# Patient Record
Sex: FEMALE | Race: WHITE | Employment: FULL TIME | URBAN - NONMETROPOLITAN AREA
[De-identification: names, ages, dates, MRNs, and addresses within clinical notes are randomized per-mention and may not be internally consistent; named-entity substitution may affect disease eponyms.]

---

## 2017-06-05 ENCOUNTER — OFFICE VISIT (OUTPATIENT)
Dept: FAMILY MEDICINE CLINIC | Age: 22
End: 2017-06-05
Payer: MEDICAID

## 2017-06-05 VITALS
HEART RATE: 68 BPM | WEIGHT: 221 LBS | TEMPERATURE: 98.8 F | HEIGHT: 65 IN | OXYGEN SATURATION: 97 % | RESPIRATION RATE: 20 BRPM | SYSTOLIC BLOOD PRESSURE: 116 MMHG | DIASTOLIC BLOOD PRESSURE: 78 MMHG | BODY MASS INDEX: 36.82 KG/M2

## 2017-06-05 DIAGNOSIS — R79.89 ELEVATED FERRITIN: ICD-10-CM

## 2017-06-05 DIAGNOSIS — H04.332: Primary | ICD-10-CM

## 2017-06-05 LAB
BASOPHILS ABSOLUTE: 0.1 K/UL (ref 0–0.2)
BASOPHILS RELATIVE PERCENT: 0.5 % (ref 0–1)
EOSINOPHILS ABSOLUTE: 0.3 K/UL (ref 0–0.6)
EOSINOPHILS RELATIVE PERCENT: 3.2 % (ref 0–5)
FERRITIN: 42.3 NG/ML (ref 13–150)
FOLATE: 6.2 NG/ML (ref 4.8–37.3)
HCT VFR BLD CALC: 42.9 % (ref 37–47)
HEMOGLOBIN: 14.5 G/DL (ref 12–16)
IRON SATURATION: 18 % (ref 14–50)
IRON: 63 UG/DL (ref 37–145)
LYMPHOCYTES ABSOLUTE: 3.4 K/UL (ref 1.1–4.5)
LYMPHOCYTES RELATIVE PERCENT: 32.5 % (ref 20–40)
MCH RBC QN AUTO: 29.6 PG (ref 27–31)
MCHC RBC AUTO-ENTMCNC: 33.8 G/DL (ref 33–37)
MCV RBC AUTO: 87.6 FL (ref 81–99)
MONOCYTES ABSOLUTE: 1 K/UL (ref 0–0.9)
MONOCYTES RELATIVE PERCENT: 9.5 % (ref 0–10)
NEUTROPHILS ABSOLUTE: 5.6 K/UL (ref 1.5–7.5)
NEUTROPHILS RELATIVE PERCENT: 54.1 % (ref 50–65)
PDW BLD-RTO: 12.3 % (ref 11.5–14.5)
PLATELET # BLD: 427 K/UL (ref 130–400)
PMV BLD AUTO: 9.4 FL (ref 9.4–12.3)
RBC # BLD: 4.9 M/UL (ref 4.2–5.4)
RETICULOCYTE ABSOLUTE COUNT: 0.09 M/UL (ref 0.03–0.12)
RETICULOCYTE COUNT PCT: 1.88 % (ref 0.5–1.5)
TOTAL IRON BINDING CAPACITY: 341 UG/DL (ref 250–400)
WBC # BLD: 10.4 K/UL (ref 4.8–10.8)

## 2017-06-05 PROCEDURE — 99214 OFFICE O/P EST MOD 30 MIN: CPT | Performed by: NURSE PRACTITIONER

## 2017-06-05 RX ORDER — TOBRAMYCIN 3 MG/ML
1 SOLUTION/ DROPS OPHTHALMIC EVERY 4 HOURS
Qty: 1 BOTTLE | Refills: 0 | Status: SHIPPED | OUTPATIENT
Start: 2017-06-05 | End: 2017-06-15

## 2017-06-05 ASSESSMENT — ENCOUNTER SYMPTOMS
EYE REDNESS: 1
SHORTNESS OF BREATH: 0
EYE DISCHARGE: 1

## 2017-12-15 ENCOUNTER — OFFICE VISIT (OUTPATIENT)
Dept: FAMILY MEDICINE CLINIC | Age: 22
End: 2017-12-15
Payer: COMMERCIAL

## 2017-12-15 VITALS
DIASTOLIC BLOOD PRESSURE: 78 MMHG | RESPIRATION RATE: 20 BRPM | SYSTOLIC BLOOD PRESSURE: 126 MMHG | HEART RATE: 88 BPM | TEMPERATURE: 98.2 F | WEIGHT: 212 LBS | OXYGEN SATURATION: 98 % | BODY MASS INDEX: 35.28 KG/M2

## 2017-12-15 DIAGNOSIS — R11.15 NON-INTRACTABLE CYCLICAL VOMITING WITH NAUSEA: ICD-10-CM

## 2017-12-15 DIAGNOSIS — R10.13 DYSPEPSIA: ICD-10-CM

## 2017-12-15 DIAGNOSIS — R11.15 NON-INTRACTABLE CYCLICAL VOMITING WITH NAUSEA: Primary | ICD-10-CM

## 2017-12-15 LAB
ALBUMIN SERPL-MCNC: 4.8 G/DL (ref 3.5–5.2)
ALP BLD-CCNC: 69 U/L (ref 35–104)
ALT SERPL-CCNC: 25 U/L (ref 5–33)
AMYLASE: 47 U/L (ref 28–100)
ANION GAP SERPL CALCULATED.3IONS-SCNC: 14 MMOL/L (ref 7–19)
AST SERPL-CCNC: 20 U/L (ref 5–32)
BASOPHILS ABSOLUTE: 0 K/UL (ref 0–0.2)
BASOPHILS RELATIVE PERCENT: 0.3 % (ref 0–1)
BILIRUB SERPL-MCNC: 0.4 MG/DL (ref 0.2–1.2)
BILIRUBIN URINE: NEGATIVE
BLOOD, URINE: NEGATIVE
BUN BLDV-MCNC: 10 MG/DL (ref 6–20)
CALCIUM SERPL-MCNC: 9.5 MG/DL (ref 8.6–10)
CHLORIDE BLD-SCNC: 100 MMOL/L (ref 98–111)
CLARITY: ABNORMAL
CO2: 27 MMOL/L (ref 22–29)
COLOR: YELLOW
CREAT SERPL-MCNC: 0.7 MG/DL (ref 0.5–0.9)
EOSINOPHILS ABSOLUTE: 0.2 K/UL (ref 0–0.6)
EOSINOPHILS RELATIVE PERCENT: 1.8 % (ref 0–5)
GFR NON-AFRICAN AMERICAN: >60
GLUCOSE BLD-MCNC: 82 MG/DL (ref 74–109)
GLUCOSE URINE: NEGATIVE MG/DL
HCT VFR BLD CALC: 44.2 % (ref 37–47)
HEMOGLOBIN: 14.8 G/DL (ref 12–16)
KETONES, URINE: NEGATIVE MG/DL
LEUKOCYTE ESTERASE, URINE: NEGATIVE
LIPASE: 70 U/L (ref 13–60)
LYMPHOCYTES ABSOLUTE: 4.1 K/UL (ref 1.1–4.5)
LYMPHOCYTES RELATIVE PERCENT: 30.9 % (ref 20–40)
MCH RBC QN AUTO: 28.7 PG (ref 27–31)
MCHC RBC AUTO-ENTMCNC: 33.5 G/DL (ref 33–37)
MCV RBC AUTO: 85.7 FL (ref 81–99)
MONOCYTES ABSOLUTE: 0.8 K/UL (ref 0–0.9)
MONOCYTES RELATIVE PERCENT: 6.3 % (ref 0–10)
NEUTROPHILS ABSOLUTE: 8 K/UL (ref 1.5–7.5)
NEUTROPHILS RELATIVE PERCENT: 60.4 % (ref 50–65)
NITRITE, URINE: NEGATIVE
PDW BLD-RTO: 12.3 % (ref 11.5–14.5)
PH UA: 6
PLATELET # BLD: 571 K/UL (ref 130–400)
PMV BLD AUTO: 9.1 FL (ref 9.4–12.3)
POTASSIUM SERPL-SCNC: 4.2 MMOL/L (ref 3.5–5)
PROTEIN UA: NEGATIVE MG/DL
RBC # BLD: 5.16 M/UL (ref 4.2–5.4)
SODIUM BLD-SCNC: 141 MMOL/L (ref 136–145)
SPECIFIC GRAVITY UA: 1.03
T4 FREE: 1.3 NG/DL (ref 0.9–1.7)
TOTAL PROTEIN: 7.9 G/DL (ref 6.6–8.7)
TSH SERPL DL<=0.05 MIU/L-ACNC: 1.62 UIU/ML (ref 0.27–4.2)
UROBILINOGEN, URINE: 0.2 E.U./DL
WBC # BLD: 13.3 K/UL (ref 4.8–10.8)

## 2017-12-15 PROCEDURE — 99213 OFFICE O/P EST LOW 20 MIN: CPT | Performed by: NURSE PRACTITIONER

## 2017-12-15 RX ORDER — PANTOPRAZOLE SODIUM 40 MG/1
TABLET, DELAYED RELEASE ORAL
Qty: 45 TABLET | Refills: 2 | Status: SHIPPED | OUTPATIENT
Start: 2017-12-15 | End: 2018-01-03 | Stop reason: ALTCHOICE

## 2017-12-15 RX ORDER — PROMETHAZINE HYDROCHLORIDE 25 MG/1
25 TABLET ORAL EVERY 6 HOURS PRN
COMMUNITY
End: 2019-02-22 | Stop reason: SDUPTHER

## 2017-12-15 RX ORDER — ONDANSETRON HYDROCHLORIDE 8 MG/1
8 TABLET, FILM COATED ORAL EVERY 8 HOURS PRN
COMMUNITY
End: 2018-02-10 | Stop reason: SDUPTHER

## 2017-12-15 RX ORDER — DICYCLOMINE HCL 20 MG
20 TABLET ORAL 3 TIMES DAILY PRN
Qty: 60 TABLET | Refills: 2 | Status: SHIPPED | OUTPATIENT
Start: 2017-12-15

## 2017-12-15 ASSESSMENT — ENCOUNTER SYMPTOMS
NAUSEA: 1
ABDOMINAL PAIN: 1
VOMITING: 1
DIARRHEA: 1

## 2017-12-15 NOTE — PROGRESS NOTES
Subjective:      Patient ID: Sofy Guerrero is a 25 y.o. female. Chief Complaint   Patient presents with    Nausea & Vomiting     Patient reports nausea and vomiting on and off since February. Patient reports going to [facility] ER last Friday and was told she had an intestinal virus. HPI    Pt states at hospital, pt had ua and lab. She was told that she had stomach virus. Pt is with family reporting that female members of family have had choley. Over the course of last 8-9mo pt states that she has had intermittent abd pain to upper abd/to back \"burning pulling sensation\". She reports that usually this lasts 3-4hrs at a time. She reports this last weekend symptoms were more significant. Food trigger-dairy, pork, spicy     Pt has been started on Prilosec when away at college (in April). States taking it for 20days. Improved for that timeframe    Vitals 12/15/2017 1/3/4552   SYSTOLIC 386 082   DIASTOLIC 78 78   Site Right Arm Right Arm   Position Sitting Sitting   Cuff Size Small Adult Large Adult   Pulse 88 68   Temp 98.2 98.8   Resp 20 20   Weight 212 lb 221 lb   Height  5' 5\"   BMI (wt*703/ht~2)  36.77 kg/m2       Pt states wt loss has been intentional  Pt has been making dietary changes to attempt improving sx. Review of Systems   Constitutional: Negative for fever and unexpected weight change. Gastrointestinal: Positive for abdominal pain, diarrhea, nausea and vomiting. Psychiatric/Behavioral: The patient is nervous/anxious. Past Medical History:   Diagnosis Date    Broken arm     MRSA (methicillin resistant staph aureus) culture positive 08/2014    Pericarditis 2000    hx     No current outpatient prescriptions on file prior to visit. No current facility-administered medications on file prior to visit. No Known Allergies    Objective:   Physical Exam   Constitutional: She is oriented to person, place, and time.  She appears well-developed and well-nourished. No distress. overweight   HENT:   Head: Normocephalic and atraumatic. Right Ear: External ear normal.   Left Ear: External ear normal.   Nose: Nose normal.   Mouth/Throat: Oropharynx is clear and moist. No oropharyngeal exudate. Eyes: Conjunctivae and EOM are normal. Pupils are equal, round, and reactive to light. Neck: Normal range of motion. Neck supple. No tracheal deviation present. Cardiovascular: Normal rate, regular rhythm and normal heart sounds. Pulmonary/Chest: Effort normal and breath sounds normal. No respiratory distress. Abdominal: Soft. Bowel sounds are normal. She exhibits no distension and no mass. There is no tenderness (epigastric and RUQ). There is no rebound and no guarding. Lymphadenopathy:     She has no cervical adenopathy. Neurological: She is alert and oriented to person, place, and time. Skin: Skin is warm and dry. She is not diaphoretic. Psychiatric: She has a normal mood and affect. Her behavior is normal.   Nursing note and vitals reviewed. /78 (Site: Right Arm, Position: Sitting, Cuff Size: Small Adult)   Pulse 88   Temp 98.2 °F (36.8 °C) (Temporal)   Resp 20   Wt 212 lb (96.2 kg)   SpO2 98%   BMI 35.28 kg/m²     Assessment:      1. Non-intractable cyclical vomiting with nausea  US GALLBLADDER RUQ    dicyclomine (BENTYL) 20 MG tablet    Urinalysis    CBC Auto Differential    TSH without Reflex    T4, Free    Comprehensive Metabolic Panel    AMYLASE    Lipase    H. Pylori Antigen, Stool    Clostridium Difficile Toxin/Antigen    Fecal leukocytes    Ova and parasite screen    Culture Stool   2.  Dyspepsia  US GALLBLADDER RUQ    pantoprazole (PROTONIX) 40 MG tablet    dicyclomine (BENTYL) 20 MG tablet    Urinalysis    CBC Auto Differential    TSH without Reflex    T4, Free    Comprehensive Metabolic Panel    AMYLASE    Lipase    H. Pylori Antigen, Stool    Clostridium Difficile Toxin/Antigen    Fecal leukocytes    Ova and parasite screen Culture Stool           Plan:    Plan as above  Lab today and schedule u/s  Start Protonix as ordered  F/u in office in 6wks and will be in touch with lab/tests before that time over the phone  PRN [facility] HIDA  PRN GI  F/u asap if worse

## 2017-12-20 DIAGNOSIS — R10.13 DYSPEPSIA: ICD-10-CM

## 2017-12-20 DIAGNOSIS — R11.15 NON-INTRACTABLE CYCLICAL VOMITING WITH NAUSEA: ICD-10-CM

## 2017-12-20 LAB
C DIFFICILE TOXIN, EIA: NORMAL
LACTOFERRIN, FECAL: NEGATIVE

## 2017-12-22 ENCOUNTER — HOSPITAL ENCOUNTER (OUTPATIENT)
Dept: GENERAL RADIOLOGY | Age: 22
Discharge: HOME | End: 2017-12-22
Payer: COMMERCIAL

## 2017-12-22 DIAGNOSIS — R11.15 NON-INTRACTABLE CYCLICAL VOMITING WITH NAUSEA: ICD-10-CM

## 2017-12-22 DIAGNOSIS — R10.13 DYSPEPSIA: ICD-10-CM

## 2017-12-22 LAB — H PYLORI ANTIGEN STOOL: NEGATIVE

## 2017-12-22 PROCEDURE — 76705 ECHO EXAM OF ABDOMEN: CPT

## 2017-12-23 LAB
OVA AND PARASITE TRICHROME: NEGATIVE
OVA AND PARASITE WET MOUNT: NEGATIVE

## 2017-12-24 LAB
CULTURE, STOOL: NORMAL
E COLI SHIGA TOXIN ASSAY: NORMAL

## 2017-12-27 DIAGNOSIS — R10.11 RUQ PAIN: ICD-10-CM

## 2017-12-27 DIAGNOSIS — R11.0 NAUSEA: Primary | ICD-10-CM

## 2018-01-03 ENCOUNTER — OFFICE VISIT (OUTPATIENT)
Dept: FAMILY MEDICINE CLINIC | Age: 23
End: 2018-01-03
Payer: COMMERCIAL

## 2018-01-03 VITALS
BODY MASS INDEX: 36.28 KG/M2 | DIASTOLIC BLOOD PRESSURE: 62 MMHG | TEMPERATURE: 97.9 F | OXYGEN SATURATION: 98 % | HEART RATE: 80 BPM | RESPIRATION RATE: 18 BRPM | SYSTOLIC BLOOD PRESSURE: 104 MMHG | WEIGHT: 218 LBS

## 2018-01-03 DIAGNOSIS — F32.A ANXIETY AND DEPRESSION: ICD-10-CM

## 2018-01-03 DIAGNOSIS — D72.829 LEUKOCYTOSIS, UNSPECIFIED TYPE: ICD-10-CM

## 2018-01-03 DIAGNOSIS — F41.9 ANXIETY AND DEPRESSION: ICD-10-CM

## 2018-01-03 DIAGNOSIS — K21.9 GASTROESOPHAGEAL REFLUX DISEASE, ESOPHAGITIS PRESENCE NOT SPECIFIED: Primary | ICD-10-CM

## 2018-01-03 DIAGNOSIS — R74.8 ELEVATED LIPASE: ICD-10-CM

## 2018-01-03 DIAGNOSIS — E78.2 MIXED HYPERLIPIDEMIA: ICD-10-CM

## 2018-01-03 PROCEDURE — 99214 OFFICE O/P EST MOD 30 MIN: CPT | Performed by: NURSE PRACTITIONER

## 2018-01-03 RX ORDER — DESVENLAFAXINE 50 MG/1
50 TABLET, EXTENDED RELEASE ORAL DAILY
Qty: 30 TABLET | Refills: 2 | Status: SHIPPED | OUTPATIENT
Start: 2018-01-03 | End: 2018-04-04 | Stop reason: SDUPTHER

## 2018-01-03 RX ORDER — ESOMEPRAZOLE MAGNESIUM 40 MG/1
40 CAPSULE, DELAYED RELEASE ORAL
Qty: 30 CAPSULE | Refills: 2 | Status: SHIPPED | OUTPATIENT
Start: 2018-01-03 | End: 2018-09-07 | Stop reason: SDUPTHER

## 2018-01-03 ASSESSMENT — ENCOUNTER SYMPTOMS
NAUSEA: 1
ABDOMINAL PAIN: 1
DIARRHEA: 1
VOMITING: 1

## 2018-01-03 NOTE — PROGRESS NOTES
Subjective:      Patient ID: Urbano Verdugo is a 25 y.o. female. Chief Complaint   Patient presents with    Follow-up     Patient is here today for a follow up r/t nausea and abdominal pain. Patient is scheduled for hida tomorrow. Patient states that she is not having as many \"attacks\" and is not vomiting as much.  Cyst     Patient has a knot in front of her right ear since yesterday. HPI        Knot   In front of right ear  Onset was yesterday  Pt reports having \"cold\" over the last few days          Anxiety  Onset years even as 8-9yr old  celexa x7mo and lexapro x 5mo and pt did not have any improvement but deal with nausea  Pt does have h/o panic attacks  Pt is dreadful of going back to work   She states that she has less anxiety and less abdominal symptoms       Nausea/vomiting  Last vomiting episode was Dec 27th. This is gradually improving. tx-Protonix. Pt states she paid out of pocket for Protonix. Pt had negative U/s gallbladder and is scheduled for HIDA tomorrow. Abdominal cramps have also improved        Review of Systems   Constitutional: Negative for fever. HENT: Positive for congestion. Gastrointestinal: Positive for abdominal pain, diarrhea, nausea and vomiting. Neurological: Negative for headaches. Psychiatric/Behavioral: The patient is nervous/anxious.       Past Medical History:   Diagnosis Date    Broken arm     MRSA (methicillin resistant staph aureus) culture positive 08/2014    Pericarditis 2000    hx     Current Outpatient Prescriptions on File Prior to Visit   Medication Sig Dispense Refill    ondansetron (ZOFRAN) 8 MG tablet Take 8 mg by mouth every 8 hours as needed for Nausea or Vomiting      dicyclomine (BENTYL) 20 MG tablet Take 1 tablet by mouth 3 times daily as needed (abdominal spasms) 60 tablet 2    promethazine (PHENERGAN) 25 MG tablet Take 25 mg by mouth every 6 hours as needed for Nausea       No current facility-administered medications on file prior to visit. No Known Allergies    Objective:   Physical Exam   Constitutional: She is oriented to person, place, and time. She appears well-developed and well-nourished. No distress. HENT:   Head: Normocephalic and atraumatic. Right Ear: External ear normal.   Left Ear: External ear normal.   Nose: Nose normal.   Mouth/Throat: Oropharynx is clear and moist. No oropharyngeal exudate. Eyes: Conjunctivae and EOM are normal. Pupils are equal, round, and reactive to light. Neck: Normal range of motion. Neck supple. No tracheal deviation present. Cardiovascular: Normal rate, regular rhythm and normal heart sounds. Pulmonary/Chest: Effort normal and breath sounds normal. No respiratory distress. Abdominal: Soft. Bowel sounds are normal. There is no tenderness. Lymphadenopathy:        Head (right side): Preauricular adenopathy present. She has no cervical adenopathy. Neurological: She is alert and oriented to person, place, and time. Skin: Skin is warm and dry. She is not diaphoretic. Psychiatric: She has a normal mood and affect. Her behavior is normal.   Nursing note and vitals reviewed. /62 (Site: Left Arm, Position: Sitting, Cuff Size: Small Adult)   Pulse 80   Temp 97.9 °F (36.6 °C) (Temporal)   Resp 18   Wt 218 lb (98.9 kg)   SpO2 98%   BMI 36.28 kg/m²     Assessment:      1. Gastroesophageal reflux disease, esophagitis presence not specified  esomeprazole (NEXIUM) 40 MG delayed release capsule   2. Mixed hyperlipidemia  Lipid Panel   3. Elevated lipase  Lipase   4. Leukocytosis, unspecified type  CBC Auto Differential   5. Anxiety and depression  desvenlafaxine succinate (PRISTIQ) 50 MG TB24 extended release tablet           Plan:    Plan as above  Lab due in 2wks  Side effects of new treatment discussed. Pt will f/u asap if any increase in negativity. Never stop medication without consulting healthcare provider.   F/u ov approx 2-3wks

## 2018-01-04 ENCOUNTER — HOSPITAL ENCOUNTER (OUTPATIENT)
Dept: NUCLEAR MEDICINE | Age: 23
Discharge: HOME | End: 2018-01-04
Payer: COMMERCIAL

## 2018-01-04 DIAGNOSIS — R11.0 NAUSEA: ICD-10-CM

## 2018-01-04 DIAGNOSIS — R10.11 RUQ PAIN: ICD-10-CM

## 2018-01-04 PROCEDURE — 78227 HEPATOBIL SYST IMAGE W/DRUG: CPT

## 2018-01-04 PROCEDURE — 6360000004 HC RX CONTRAST MEDICATION: Performed by: NURSE PRACTITIONER

## 2018-01-04 PROCEDURE — 3430000000 HC RX DIAGNOSTIC RADIOPHARMACEUTICAL: Performed by: NURSE PRACTITIONER

## 2018-01-04 PROCEDURE — A9537 TC99M MEBROFENIN: HCPCS | Performed by: NURSE PRACTITIONER

## 2018-01-04 RX ADMIN — Medication 5 MILLICURIE: at 14:09

## 2018-01-04 RX ADMIN — SINCALIDE 2 MCG: 5 INJECTION, POWDER, LYOPHILIZED, FOR SOLUTION INTRAVENOUS at 14:09

## 2018-01-05 ENCOUNTER — TELEPHONE (OUTPATIENT)
Dept: FAMILY MEDICINE CLINIC | Age: 23
End: 2018-01-05

## 2018-01-05 NOTE — TELEPHONE ENCOUNTER
PA request for generic Pristiq and generic Nexium have been approved. Patient and [address] [facility].

## 2018-01-09 NOTE — PROGRESS NOTES
EF of 38.4% was noted on HIDA. Normal is 35% or more. Pt has reported some improvement with PPI and needs to continue tx and keep f/u as we discussed.

## 2018-01-26 ENCOUNTER — OFFICE VISIT (OUTPATIENT)
Dept: FAMILY MEDICINE CLINIC | Age: 23
End: 2018-01-26
Payer: COMMERCIAL

## 2018-01-26 VITALS
HEART RATE: 77 BPM | OXYGEN SATURATION: 98 % | BODY MASS INDEX: 35.16 KG/M2 | RESPIRATION RATE: 16 BRPM | SYSTOLIC BLOOD PRESSURE: 120 MMHG | WEIGHT: 211 LBS | HEIGHT: 65 IN | DIASTOLIC BLOOD PRESSURE: 72 MMHG | TEMPERATURE: 98.3 F

## 2018-01-26 DIAGNOSIS — E78.2 MIXED HYPERLIPIDEMIA: ICD-10-CM

## 2018-01-26 DIAGNOSIS — D72.829 LEUKOCYTOSIS, UNSPECIFIED TYPE: ICD-10-CM

## 2018-01-26 DIAGNOSIS — F41.9 ANXIETY: ICD-10-CM

## 2018-01-26 DIAGNOSIS — R74.8 ELEVATED LIPASE: ICD-10-CM

## 2018-01-26 DIAGNOSIS — R11.0 NAUSEA: ICD-10-CM

## 2018-01-26 DIAGNOSIS — K21.9 GASTROESOPHAGEAL REFLUX DISEASE, ESOPHAGITIS PRESENCE NOT SPECIFIED: Primary | ICD-10-CM

## 2018-01-26 LAB
BASOPHILS ABSOLUTE: 0.1 K/UL (ref 0–0.2)
BASOPHILS RELATIVE PERCENT: 0.5 % (ref 0–1)
CHOLESTEROL, TOTAL: 214 MG/DL (ref 160–199)
EOSINOPHILS ABSOLUTE: 0.2 K/UL (ref 0–0.6)
EOSINOPHILS RELATIVE PERCENT: 1.8 % (ref 0–5)
HCT VFR BLD CALC: 44.9 % (ref 37–47)
HDLC SERPL-MCNC: 41 MG/DL (ref 65–121)
HEMOGLOBIN: 14.8 G/DL (ref 12–16)
LDL CHOLESTEROL CALCULATED: 148 MG/DL
LIPASE: 42 U/L (ref 13–60)
LYMPHOCYTES ABSOLUTE: 3.2 K/UL (ref 1.1–4.5)
LYMPHOCYTES RELATIVE PERCENT: 30 % (ref 20–40)
MCH RBC QN AUTO: 28.7 PG (ref 27–31)
MCHC RBC AUTO-ENTMCNC: 33 G/DL (ref 33–37)
MCV RBC AUTO: 87 FL (ref 81–99)
MONOCYTES ABSOLUTE: 0.9 K/UL (ref 0–0.9)
MONOCYTES RELATIVE PERCENT: 8.1 % (ref 0–10)
NEUTROPHILS ABSOLUTE: 6.3 K/UL (ref 1.5–7.5)
NEUTROPHILS RELATIVE PERCENT: 59.3 % (ref 50–65)
PDW BLD-RTO: 12.8 % (ref 11.5–14.5)
PLATELET # BLD: 445 K/UL (ref 130–400)
PMV BLD AUTO: 9.4 FL (ref 9.4–12.3)
RBC # BLD: 5.16 M/UL (ref 4.2–5.4)
TRIGL SERPL-MCNC: 124 MG/DL (ref 0–149)
WBC # BLD: 10.7 K/UL (ref 4.8–10.8)

## 2018-01-26 PROCEDURE — 99213 OFFICE O/P EST LOW 20 MIN: CPT | Performed by: NURSE PRACTITIONER

## 2018-01-26 RX ORDER — ONDANSETRON 8 MG/1
TABLET, ORALLY DISINTEGRATING ORAL
Qty: 20 TABLET | Refills: 0 | Status: SHIPPED | OUTPATIENT
Start: 2018-01-26 | End: 2018-09-07 | Stop reason: SDUPTHER

## 2018-01-26 ASSESSMENT — ENCOUNTER SYMPTOMS
ABDOMINAL PAIN: 1
NAUSEA: 1
VOMITING: 1
CONSTIPATION: 1
DIARRHEA: 0

## 2018-01-26 NOTE — PROGRESS NOTES
(NEXIUM) 40 MG delayed release capsule Take 1 capsule by mouth every morning (before breakfast) 30 capsule 2    promethazine (PHENERGAN) 25 MG tablet Take 25 mg by mouth every 6 hours as needed for Nausea      ondansetron (ZOFRAN) 8 MG tablet Take 8 mg by mouth every 8 hours as needed for Nausea or Vomiting      dicyclomine (BENTYL) 20 MG tablet Take 1 tablet by mouth 3 times daily as needed (abdominal spasms) 60 tablet 2     No current facility-administered medications on file prior to visit. No Known Allergies    Objective:   Physical Exam   Constitutional: She is oriented to person, place, and time. She appears well-developed and well-nourished. overwt. HENT:   Head: Normocephalic and atraumatic. Eyes: Conjunctivae and EOM are normal. Pupils are equal, round, and reactive to light. Neck: Normal range of motion. Neck supple. No tracheal deviation present. Cardiovascular: Normal rate, regular rhythm and normal heart sounds. Pulmonary/Chest: Effort normal and breath sounds normal.   Abdominal: Soft. Bowel sounds are normal. She exhibits no distension. There is tenderness (mild lower abd pain (pt reports menses)). There is no rebound. Neurological: She is alert and oriented to person, place, and time. Skin: Skin is warm and dry. Psychiatric: She has a normal mood and affect. Her speech is normal and behavior is normal. Judgment and thought content normal. Her mood appears not anxious. Her affect is not angry. Cognition and memory are normal. She does not exhibit a depressed mood. Nursing note and vitals reviewed. /72 (Site: Left Arm, Position: Sitting, Cuff Size: Medium Adult)   Pulse 77   Temp 98.3 °F (36.8 °C) (Temporal)   Resp 16   Ht 5' 5\" (1.651 m)   Wt 211 lb (95.7 kg)   SpO2 98%   BMI 35.11 kg/m²     Assessment:      1. Gastroesophageal reflux disease, esophagitis presence not specified  [facility]- Fiorella Guaman MD   2.  Nausea  [facility]-

## 2018-02-10 ENCOUNTER — OFFICE VISIT (OUTPATIENT)
Dept: FAMILY MEDICINE CLINIC | Age: 23
End: 2018-02-10
Payer: COMMERCIAL

## 2018-02-10 VITALS
SYSTOLIC BLOOD PRESSURE: 118 MMHG | OXYGEN SATURATION: 97 % | HEART RATE: 90 BPM | WEIGHT: 208.13 LBS | DIASTOLIC BLOOD PRESSURE: 70 MMHG | TEMPERATURE: 98.2 F | BODY MASS INDEX: 34.63 KG/M2

## 2018-02-10 DIAGNOSIS — J01.90 ACUTE SINUSITIS, RECURRENCE NOT SPECIFIED, UNSPECIFIED LOCATION: Primary | ICD-10-CM

## 2018-02-10 DIAGNOSIS — B00.1 COLD SORE: ICD-10-CM

## 2018-02-10 PROCEDURE — 99213 OFFICE O/P EST LOW 20 MIN: CPT | Performed by: NURSE PRACTITIONER

## 2018-02-10 RX ORDER — VALACYCLOVIR HYDROCHLORIDE 1 G/1
2000 TABLET, FILM COATED ORAL 2 TIMES DAILY
Qty: 4 TABLET | Refills: 2 | Status: SHIPPED | OUTPATIENT
Start: 2018-02-10 | End: 2018-09-07 | Stop reason: SDUPTHER

## 2018-02-10 RX ORDER — BROMPHENIRAMINE MALEATE, PSEUDOEPHEDRINE HYDROCHLORIDE, AND DEXTROMETHORPHAN HYDROBROMIDE 2; 30; 10 MG/5ML; MG/5ML; MG/5ML
5 SYRUP ORAL 4 TIMES DAILY PRN
Qty: 150 ML | Refills: 0 | COMMUNITY
Start: 2018-02-10 | End: 2018-09-07

## 2018-02-10 RX ORDER — METHYLPREDNISOLONE 4 MG/1
TABLET ORAL
Qty: 1 KIT | Refills: 0 | Status: SHIPPED | OUTPATIENT
Start: 2018-02-10 | End: 2018-02-16

## 2018-02-10 RX ORDER — AMOXICILLIN AND CLAVULANATE POTASSIUM 875; 125 MG/1; MG/1
1 TABLET, FILM COATED ORAL 2 TIMES DAILY
Qty: 20 TABLET | Refills: 0 | Status: SHIPPED | OUTPATIENT
Start: 2018-02-10 | End: 2018-02-20

## 2018-02-10 ASSESSMENT — ENCOUNTER SYMPTOMS
GASTROINTESTINAL NEGATIVE: 1
COUGH: 1
EYES NEGATIVE: 1
ALLERGIC/IMMUNOLOGIC NEGATIVE: 1

## 2018-04-04 DIAGNOSIS — F32.A ANXIETY AND DEPRESSION: ICD-10-CM

## 2018-04-04 DIAGNOSIS — F41.9 ANXIETY AND DEPRESSION: ICD-10-CM

## 2018-04-04 RX ORDER — DESVENLAFAXINE 50 MG/1
TABLET, EXTENDED RELEASE ORAL
Qty: 30 TABLET | Refills: 1 | Status: SHIPPED | OUTPATIENT
Start: 2018-04-04 | End: 2019-01-30 | Stop reason: ALTCHOICE

## 2018-09-07 ENCOUNTER — OFFICE VISIT (OUTPATIENT)
Dept: FAMILY MEDICINE CLINIC | Age: 23
End: 2018-09-07
Payer: COMMERCIAL

## 2018-09-07 VITALS
OXYGEN SATURATION: 98 % | SYSTOLIC BLOOD PRESSURE: 126 MMHG | WEIGHT: 184 LBS | RESPIRATION RATE: 20 BRPM | HEART RATE: 85 BPM | TEMPERATURE: 98.5 F | BODY MASS INDEX: 30.66 KG/M2 | HEIGHT: 65 IN | DIASTOLIC BLOOD PRESSURE: 76 MMHG

## 2018-09-07 DIAGNOSIS — B00.1 COLD SORE: ICD-10-CM

## 2018-09-07 DIAGNOSIS — Z30.011 ENCOUNTER FOR INITIAL PRESCRIPTION OF CONTRACEPTIVE PILLS: Primary | ICD-10-CM

## 2018-09-07 DIAGNOSIS — F41.9 ANXIETY AND DEPRESSION: ICD-10-CM

## 2018-09-07 DIAGNOSIS — Z30.011 ENCOUNTER FOR INITIAL PRESCRIPTION OF CONTRACEPTIVE PILLS: ICD-10-CM

## 2018-09-07 DIAGNOSIS — R11.0 NAUSEA: ICD-10-CM

## 2018-09-07 DIAGNOSIS — F32.A ANXIETY AND DEPRESSION: ICD-10-CM

## 2018-09-07 DIAGNOSIS — K21.9 GASTROESOPHAGEAL REFLUX DISEASE, ESOPHAGITIS PRESENCE NOT SPECIFIED: ICD-10-CM

## 2018-09-07 LAB — HCG(URINE) PREGNANCY TEST: NEGATIVE

## 2018-09-07 PROCEDURE — 99214 OFFICE O/P EST MOD 30 MIN: CPT | Performed by: NURSE PRACTITIONER

## 2018-09-07 PROCEDURE — G0444 DEPRESSION SCREEN ANNUAL: HCPCS | Performed by: NURSE PRACTITIONER

## 2018-09-07 RX ORDER — ESOMEPRAZOLE MAGNESIUM 40 MG/1
40 CAPSULE, DELAYED RELEASE ORAL
Qty: 30 CAPSULE | Refills: 3 | Status: SHIPPED | OUTPATIENT
Start: 2018-09-07

## 2018-09-07 RX ORDER — NORGESTIMATE AND ETHINYL ESTRADIOL 7DAYSX3 LO
KIT ORAL
Qty: 28 TABLET | Refills: 5 | Status: SHIPPED | OUTPATIENT
Start: 2018-09-07 | End: 2019-01-04 | Stop reason: SDUPTHER

## 2018-09-07 RX ORDER — ONDANSETRON 8 MG/1
TABLET, ORALLY DISINTEGRATING ORAL
Qty: 20 TABLET | Refills: 0 | Status: SHIPPED | OUTPATIENT
Start: 2018-09-07 | End: 2019-02-22 | Stop reason: SDUPTHER

## 2018-09-07 RX ORDER — PAROXETINE HYDROCHLORIDE 12.5 MG/1
12.5 TABLET, FILM COATED, EXTENDED RELEASE ORAL DAILY
Qty: 30 TABLET | Refills: 3 | Status: SHIPPED | OUTPATIENT
Start: 2018-09-07 | End: 2019-01-03 | Stop reason: SDUPTHER

## 2018-09-07 RX ORDER — VALACYCLOVIR HYDROCHLORIDE 1 G/1
2000 TABLET, FILM COATED ORAL 2 TIMES DAILY
Qty: 4 TABLET | Refills: 11 | Status: SHIPPED | OUTPATIENT
Start: 2018-09-07 | End: 2018-09-08

## 2018-09-07 ASSESSMENT — PATIENT HEALTH QUESTIONNAIRE - PHQ9
9. THOUGHTS THAT YOU WOULD BE BETTER OFF DEAD, OR OF HURTING YOURSELF: 2
8. MOVING OR SPEAKING SO SLOWLY THAT OTHER PEOPLE COULD HAVE NOTICED. OR THE OPPOSITE, BEING SO FIGETY OR RESTLESS THAT YOU HAVE BEEN MOVING AROUND A LOT MORE THAN USUAL: 3
7. TROUBLE CONCENTRATING ON THINGS, SUCH AS READING THE NEWSPAPER OR WATCHING TELEVISION: 2
1. LITTLE INTEREST OR PLEASURE IN DOING THINGS: 3
SUM OF ALL RESPONSES TO PHQ QUESTIONS 1-9: 24
SUM OF ALL RESPONSES TO PHQ QUESTIONS 1-9: 24
6. FEELING BAD ABOUT YOURSELF - OR THAT YOU ARE A FAILURE OR HAVE LET YOURSELF OR YOUR FAMILY DOWN: 2
10. IF YOU CHECKED OFF ANY PROBLEMS, HOW DIFFICULT HAVE THESE PROBLEMS MADE IT FOR YOU TO DO YOUR WORK, TAKE CARE OF THINGS AT HOME, OR GET ALONG WITH OTHER PEOPLE: 3
2. FEELING DOWN, DEPRESSED OR HOPELESS: 3
5. POOR APPETITE OR OVEREATING: 3
4. FEELING TIRED OR HAVING LITTLE ENERGY: 3
SUM OF ALL RESPONSES TO PHQ9 QUESTIONS 1 & 2: 6
3. TROUBLE FALLING OR STAYING ASLEEP: 3

## 2018-09-07 ASSESSMENT — ENCOUNTER SYMPTOMS: NAUSEA: 1

## 2018-09-07 NOTE — PROGRESS NOTES
[facility]   Norgestim-Eth Estrad Triphasic (ORTHO TRI-CYCLEN LO) 0.18/0.215/0.25 MG-25 MCG TABS 1 po daily Yes [facility]   PARoxetine (PAXIL CR) 12.5 MG extended release tablet Take 1 tablet by mouth daily Yes [facility]   esomeprazole (NEXIUM) 40 MG delayed release capsule Take 1 capsule by mouth every morning (before breakfast) Yes [facility]   ondansetron (ZOFRAN-ODT) 8 MG TBDP disintegrating tablet 1/2-1 meltaway orally every 8hrs prn nausea and vomiting Yes [facility]   promethazine (PHENERGAN) 25 MG tablet Take 25 mg by mouth every 6 hours as needed for Nausea Yes Historical Provider, MD   dicyclomine (BENTYL) 20 MG tablet Take 1 tablet by mouth 3 times daily as needed (abdominal spasms) Yes [facility]   desvenlafaxine succinate (PRISTIQ) 50 MG TB24 extended release tablet TAKE ONE TABLET BY MOUTH DAILY  [facility]     No Known Allergies  Past Surgical History:   Procedure Laterality Date    HERNIA REPAIR  07/2007    TONSILLECTOMY  2010     Family History   Problem Relation Age of Onset    Heart Failure Mother     Other Father         alcoholic    Diabetes Maternal Grandfather     Heart Failure Maternal Grandfather     Other Other         dementia great grandmother     Social History     Social History    Marital status: Single     Spouse name: N/A    Number of children: N/A    Years of education: N/A     Occupational History    Not on file. Social History Main Topics    Smoking status: Never Smoker    Smokeless tobacco: Never Used    Alcohol use 0.0 oz/week      Comment: rarely    Drug use: No    Sexual activity: Not on file     Other Topics Concern    Not on file     Social History Narrative    No narrative on file       Review of Systems   Constitutional: Positive for fatigue. Negative for unexpected weight change. HENT:        Cold sores     Gastrointestinal: Positive for nausea (intermittent).    Genitourinary: Negative for menstrual

## 2018-09-28 ENCOUNTER — HOSPITAL ENCOUNTER (OUTPATIENT)
Age: 23
Setting detail: SPECIMEN
Discharge: HOME | End: 2018-09-28
Payer: COMMERCIAL

## 2018-09-28 ENCOUNTER — OFFICE VISIT (OUTPATIENT)
Dept: FAMILY MEDICINE CLINIC | Age: 23
End: 2018-09-28
Payer: COMMERCIAL

## 2018-09-28 VITALS
OXYGEN SATURATION: 98 % | DIASTOLIC BLOOD PRESSURE: 60 MMHG | SYSTOLIC BLOOD PRESSURE: 122 MMHG | BODY MASS INDEX: 31.16 KG/M2 | WEIGHT: 187 LBS | HEART RATE: 94 BPM | HEIGHT: 65 IN | TEMPERATURE: 98.4 F

## 2018-09-28 DIAGNOSIS — Z01.419 PAP SMEAR, AS PART OF ROUTINE GYNECOLOGICAL EXAMINATION: ICD-10-CM

## 2018-09-28 DIAGNOSIS — Z00.00 PREVENTATIVE HEALTH CARE: Primary | ICD-10-CM

## 2018-09-28 DIAGNOSIS — F41.9 ANXIETY: ICD-10-CM

## 2018-09-28 PROCEDURE — 88142 CYTOPATH C/V THIN LAYER: CPT

## 2018-09-28 PROCEDURE — 99395 PREV VISIT EST AGE 18-39: CPT | Performed by: NURSE PRACTITIONER

## 2018-09-28 ASSESSMENT — PATIENT HEALTH QUESTIONNAIRE - PHQ9
SUM OF ALL RESPONSES TO PHQ QUESTIONS 1-9: 0
SUM OF ALL RESPONSES TO PHQ9 QUESTIONS 1 & 2: 0
2. FEELING DOWN, DEPRESSED OR HOPELESS: 0
SUM OF ALL RESPONSES TO PHQ QUESTIONS 1-9: 0
1. LITTLE INTEREST OR PLEASURE IN DOING THINGS: 0

## 2018-09-28 NOTE — PROGRESS NOTES
[facility]     No Known Allergies  Past Surgical History:   Procedure Laterality Date    HERNIA REPAIR  07/2007    TONSILLECTOMY  2010     Family History   Problem Relation Age of Onset    Heart Failure Mother     Other Father         alcoholic    Diabetes Maternal Grandfather     Heart Failure Maternal Grandfather     Other Other         dementia great grandmother     Social History     Social History    Marital status: Single     Spouse name: N/A    Number of children: N/A    Years of education: N/A     Occupational History    Not on file. Social History Main Topics    Smoking status: Never Smoker    Smokeless tobacco: Never Used    Alcohol use 0.0 oz/week      Comment: rarely    Drug use: No    Sexual activity: Not on file     Other Topics Concern    Not on file     Social History Narrative    No narrative on file       Review of Systems   Constitutional: Negative for unexpected weight change. Genitourinary: Negative for menstrual problem. Psychiatric/Behavioral: The patient is nervous/anxious (improving). OBJECTIVE:    Physical Exam   Constitutional: She is oriented to person, place, and time. She appears well-developed and well-nourished. No distress. HENT:   Head: Normocephalic and atraumatic. Right Ear: External ear normal.   Left Ear: External ear normal.   Nose: Nose normal.   Mouth/Throat: Oropharynx is clear and moist. No oropharyngeal exudate. Eyes: Pupils are equal, round, and reactive to light. Conjunctivae and EOM are normal.   Neck: Trachea normal and normal range of motion. Neck supple. No JVD present. Carotid bruit is not present. No tracheal deviation present. No thyromegaly present. Cardiovascular: Normal rate, regular rhythm, normal heart sounds and intact distal pulses. Pulmonary/Chest: Effort normal and breath sounds normal. No respiratory distress.  Right breast exhibits no inverted nipple, no mass, no nipple discharge, no skin change and no
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
## 2019-01-04 ENCOUNTER — OFFICE VISIT (OUTPATIENT)
Dept: FAMILY MEDICINE CLINIC | Age: 24
End: 2019-01-04
Payer: COMMERCIAL

## 2019-01-04 VITALS
HEIGHT: 65 IN | OXYGEN SATURATION: 98 % | RESPIRATION RATE: 20 BRPM | HEART RATE: 83 BPM | WEIGHT: 201 LBS | TEMPERATURE: 99.2 F | DIASTOLIC BLOOD PRESSURE: 86 MMHG | BODY MASS INDEX: 33.49 KG/M2 | SYSTOLIC BLOOD PRESSURE: 126 MMHG

## 2019-01-04 DIAGNOSIS — F32.A ANXIETY AND DEPRESSION: ICD-10-CM

## 2019-01-04 DIAGNOSIS — F41.9 ANXIETY AND DEPRESSION: ICD-10-CM

## 2019-01-04 DIAGNOSIS — Z30.011 ENCOUNTER FOR INITIAL PRESCRIPTION OF CONTRACEPTIVE PILLS: ICD-10-CM

## 2019-01-04 DIAGNOSIS — F41.0 PANIC ATTACK: Primary | ICD-10-CM

## 2019-01-04 PROCEDURE — 99213 OFFICE O/P EST LOW 20 MIN: CPT | Performed by: NURSE PRACTITIONER

## 2019-01-04 RX ORDER — FLUTICASONE PROPIONATE 50 MCG
2 SPRAY, SUSPENSION (ML) NASAL DAILY PRN
Refills: 1 | COMMUNITY
Start: 2018-10-15

## 2019-01-04 RX ORDER — PAROXETINE HYDROCHLORIDE 25 MG/1
25 TABLET, FILM COATED, EXTENDED RELEASE ORAL EVERY MORNING
Qty: 30 TABLET | Refills: 2 | Status: SHIPPED | OUTPATIENT
Start: 2019-01-04 | End: 2019-01-30 | Stop reason: SDUPTHER

## 2019-01-04 RX ORDER — VALACYCLOVIR HYDROCHLORIDE 1 G/1
TABLET, FILM COATED ORAL
Refills: 11 | COMMUNITY
Start: 2018-12-26

## 2019-01-04 RX ORDER — NORGESTIMATE AND ETHINYL ESTRADIOL 7DAYSX3 LO
KIT ORAL
Qty: 28 TABLET | Refills: 5 | Status: SHIPPED | OUTPATIENT
Start: 2019-01-04 | End: 2019-04-01 | Stop reason: SDUPTHER

## 2019-01-04 ASSESSMENT — ENCOUNTER SYMPTOMS
VOMITING: 0
CONSTIPATION: 0
DIARRHEA: 0
NAUSEA: 0

## 2019-01-30 ENCOUNTER — OFFICE VISIT (OUTPATIENT)
Dept: FAMILY MEDICINE CLINIC | Age: 24
End: 2019-01-30
Payer: COMMERCIAL

## 2019-01-30 VITALS
OXYGEN SATURATION: 97 % | BODY MASS INDEX: 33.78 KG/M2 | WEIGHT: 203 LBS | DIASTOLIC BLOOD PRESSURE: 84 MMHG | TEMPERATURE: 98.8 F | HEART RATE: 98 BPM | SYSTOLIC BLOOD PRESSURE: 124 MMHG | RESPIRATION RATE: 20 BRPM

## 2019-01-30 DIAGNOSIS — F32.A ANXIETY AND DEPRESSION: ICD-10-CM

## 2019-01-30 DIAGNOSIS — F41.9 ANXIETY AND DEPRESSION: ICD-10-CM

## 2019-01-30 DIAGNOSIS — F41.0 PANIC ATTACKS: Primary | ICD-10-CM

## 2019-01-30 PROCEDURE — 99213 OFFICE O/P EST LOW 20 MIN: CPT | Performed by: NURSE PRACTITIONER

## 2019-01-30 RX ORDER — PAROXETINE HYDROCHLORIDE 25 MG/1
25 TABLET, FILM COATED, EXTENDED RELEASE ORAL EVERY MORNING
Qty: 30 TABLET | Refills: 5 | Status: SHIPPED | OUTPATIENT
Start: 2019-01-30 | End: 2019-07-22 | Stop reason: SDUPTHER

## 2019-02-22 ENCOUNTER — OFFICE VISIT (OUTPATIENT)
Dept: FAMILY MEDICINE CLINIC | Age: 24
End: 2019-02-22
Payer: COMMERCIAL

## 2019-02-22 VITALS
RESPIRATION RATE: 20 BRPM | TEMPERATURE: 99.4 F | BODY MASS INDEX: 33.45 KG/M2 | OXYGEN SATURATION: 98 % | WEIGHT: 201 LBS | SYSTOLIC BLOOD PRESSURE: 122 MMHG | DIASTOLIC BLOOD PRESSURE: 80 MMHG | HEART RATE: 121 BPM

## 2019-02-22 DIAGNOSIS — B96.89 BACTERIAL SINUSITIS: Primary | ICD-10-CM

## 2019-02-22 DIAGNOSIS — R50.9 FEVER, UNSPECIFIED: ICD-10-CM

## 2019-02-22 DIAGNOSIS — J32.9 BACTERIAL SINUSITIS: Primary | ICD-10-CM

## 2019-02-22 LAB
INFLUENZA A ANTIBODY: NEGATIVE
INFLUENZA B ANTIBODY: NEGATIVE

## 2019-02-22 PROCEDURE — 99213 OFFICE O/P EST LOW 20 MIN: CPT | Performed by: NURSE PRACTITIONER

## 2019-02-22 PROCEDURE — 87804 INFLUENZA ASSAY W/OPTIC: CPT | Performed by: NURSE PRACTITIONER

## 2019-02-22 RX ORDER — PROMETHAZINE HYDROCHLORIDE 25 MG/1
25 TABLET ORAL EVERY 6 HOURS PRN
Qty: 30 TABLET | Refills: 0 | Status: SHIPPED | OUTPATIENT
Start: 2019-02-22

## 2019-02-22 RX ORDER — AMOXICILLIN AND CLAVULANATE POTASSIUM 875; 125 MG/1; MG/1
1 TABLET, FILM COATED ORAL 2 TIMES DAILY
Qty: 20 TABLET | Refills: 0 | Status: SHIPPED | OUTPATIENT
Start: 2019-02-22 | End: 2019-03-04

## 2019-02-22 RX ORDER — DEXTROMETHORPHAN HYDROBROMIDE AND PROMETHAZINE HYDROCHLORIDE 15; 6.25 MG/5ML; MG/5ML
5 SYRUP ORAL 4 TIMES DAILY PRN
Qty: 160 ML | Refills: 0 | Status: SHIPPED | OUTPATIENT
Start: 2019-02-22 | End: 2019-03-01

## 2019-02-22 RX ORDER — ONDANSETRON 8 MG/1
TABLET, ORALLY DISINTEGRATING ORAL
Qty: 20 TABLET | Refills: 0 | Status: SHIPPED | OUTPATIENT
Start: 2019-02-22

## 2019-02-22 ASSESSMENT — PATIENT HEALTH QUESTIONNAIRE - PHQ9
SUM OF ALL RESPONSES TO PHQ9 QUESTIONS 1 & 2: 2
SUM OF ALL RESPONSES TO PHQ QUESTIONS 1-9: 2
1. LITTLE INTEREST OR PLEASURE IN DOING THINGS: 1
SUM OF ALL RESPONSES TO PHQ QUESTIONS 1-9: 2
2. FEELING DOWN, DEPRESSED OR HOPELESS: 1

## 2019-02-22 ASSESSMENT — ENCOUNTER SYMPTOMS
COUGH: 1
SORE THROAT: 1

## 2019-03-13 ENCOUNTER — OFFICE VISIT (OUTPATIENT)
Dept: PSYCHIATRY | Age: 24
End: 2019-03-13
Payer: COMMERCIAL

## 2019-03-13 DIAGNOSIS — F32.A ANXIETY AND DEPRESSION: Primary | ICD-10-CM

## 2019-03-13 DIAGNOSIS — F41.9 ANXIETY AND DEPRESSION: Primary | ICD-10-CM

## 2019-03-13 PROCEDURE — 90791 PSYCH DIAGNOSTIC EVALUATION: CPT | Performed by: SOCIAL WORKER

## 2019-04-01 ENCOUNTER — OFFICE VISIT (OUTPATIENT)
Dept: PSYCHIATRY | Age: 24
End: 2019-04-01
Payer: COMMERCIAL

## 2019-04-01 ENCOUNTER — OFFICE VISIT (OUTPATIENT)
Dept: FAMILY MEDICINE CLINIC | Age: 24
End: 2019-04-01
Payer: COMMERCIAL

## 2019-04-01 VITALS
SYSTOLIC BLOOD PRESSURE: 128 MMHG | HEART RATE: 71 BPM | WEIGHT: 210 LBS | OXYGEN SATURATION: 99 % | DIASTOLIC BLOOD PRESSURE: 82 MMHG | BODY MASS INDEX: 34.95 KG/M2 | TEMPERATURE: 98.3 F | RESPIRATION RATE: 20 BRPM

## 2019-04-01 DIAGNOSIS — Z30.011 ENCOUNTER FOR INITIAL PRESCRIPTION OF CONTRACEPTIVE PILLS: ICD-10-CM

## 2019-04-01 DIAGNOSIS — R59.1 LYMPHADENOPATHY OF HEAD AND NECK: Primary | ICD-10-CM

## 2019-04-01 DIAGNOSIS — F41.9 ANXIETY AND DEPRESSION: Primary | ICD-10-CM

## 2019-04-01 DIAGNOSIS — F32.A ANXIETY AND DEPRESSION: Primary | ICD-10-CM

## 2019-04-01 PROCEDURE — 99213 OFFICE O/P EST LOW 20 MIN: CPT | Performed by: NURSE PRACTITIONER

## 2019-04-01 PROCEDURE — 90832 PSYTX W PT 30 MINUTES: CPT | Performed by: SOCIAL WORKER

## 2019-04-01 RX ORDER — AMOXICILLIN AND CLAVULANATE POTASSIUM 875; 125 MG/1; MG/1
1 TABLET, FILM COATED ORAL 2 TIMES DAILY
Qty: 20 TABLET | Refills: 0 | Status: SHIPPED | OUTPATIENT
Start: 2019-04-01 | End: 2019-04-11

## 2019-04-01 RX ORDER — NORGESTIMATE AND ETHINYL ESTRADIOL 7DAYSX3 LO
KIT ORAL
Qty: 28 TABLET | Refills: 5 | Status: SHIPPED | OUTPATIENT
Start: 2019-04-01 | End: 2019-09-10 | Stop reason: SDUPTHER

## 2019-04-01 ASSESSMENT — ENCOUNTER SYMPTOMS
SORE THROAT: 1
COUGH: 1
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
## 2019-04-02 NOTE — PATIENT INSTRUCTIONS
1.  Return in 2 Weeks as scheduled. 2.  Call Nicole Hanks if you need to come in earlier or reschedule at [phone]   Provided handout on how Trauma Affects the Brain.

## 2019-04-25 ENCOUNTER — OFFICE VISIT (OUTPATIENT)
Dept: PSYCHIATRY | Age: 24
End: 2019-04-25
Payer: COMMERCIAL

## 2019-04-25 DIAGNOSIS — F41.9 ANXIETY AND DEPRESSION: Primary | ICD-10-CM

## 2019-04-25 DIAGNOSIS — F32.A ANXIETY AND DEPRESSION: Primary | ICD-10-CM

## 2019-04-25 PROCEDURE — 90837 PSYTX W PT 60 MINUTES: CPT | Performed by: SOCIAL WORKER

## 2019-04-25 ASSESSMENT — ANXIETY QUESTIONNAIRES
3. WORRYING TOO MUCH ABOUT DIFFERENT THINGS: 0-NOT AT ALL SURE
GAD7 TOTAL SCORE: 15
4. TROUBLE RELAXING: 3-NEARLY EVERY DAY
5. BEING SO RESTLESS THAT IT IS HARD TO SIT STILL: 3-NEARLY EVERY DAY
7. FEELING AFRAID AS IF SOMETHING AWFUL MIGHT HAPPEN: 2-OVER HALF THE DAYS
2. NOT BEING ABLE TO STOP OR CONTROL WORRYING: 2-OVER HALF THE DAYS
1. FEELING NERVOUS, ANXIOUS, OR ON EDGE: 3-NEARLY EVERY DAY
6. BECOMING EASILY ANNOYED OR IRRITABLE: 2-OVER HALF THE DAYS

## 2019-04-25 ASSESSMENT — PATIENT HEALTH QUESTIONNAIRE - PHQ9
SUM OF ALL RESPONSES TO PHQ QUESTIONS 1-9: 17
SUM OF ALL RESPONSES TO PHQ9 QUESTIONS 1 & 2: 4
3. TROUBLE FALLING OR STAYING ASLEEP: 3
4. FEELING TIRED OR HAVING LITTLE ENERGY: 2
8. MOVING OR SPEAKING SO SLOWLY THAT OTHER PEOPLE COULD HAVE NOTICED. OR THE OPPOSITE, BEING SO FIGETY OR RESTLESS THAT YOU HAVE BEEN MOVING AROUND A LOT MORE THAN USUAL: 2
5. POOR APPETITE OR OVEREATING: 0
SUM OF ALL RESPONSES TO PHQ QUESTIONS 1-9: 17
1. LITTLE INTEREST OR PLEASURE IN DOING THINGS: 2
10. IF YOU CHECKED OFF ANY PROBLEMS, HOW DIFFICULT HAVE THESE PROBLEMS MADE IT FOR YOU TO DO YOUR WORK, TAKE CARE OF THINGS AT HOME, OR GET ALONG WITH OTHER PEOPLE: 2
9. THOUGHTS THAT YOU WOULD BE BETTER OFF DEAD, OR OF HURTING YOURSELF: 3
7. TROUBLE CONCENTRATING ON THINGS, SUCH AS READING THE NEWSPAPER OR WATCHING TELEVISION: 1
2. FEELING DOWN, DEPRESSED OR HOPELESS: 2
6. FEELING BAD ABOUT YOURSELF - OR THAT YOU ARE A FAILURE OR HAVE LET YOURSELF OR YOUR FAMILY DOWN: 2
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
## 2019-06-10 ENCOUNTER — OFFICE VISIT (OUTPATIENT)
Dept: FAMILY MEDICINE CLINIC | Age: 24
End: 2019-06-10
Payer: COMMERCIAL

## 2019-06-10 ENCOUNTER — HOSPITAL ENCOUNTER (OUTPATIENT)
Dept: GENERAL RADIOLOGY | Age: 24
Discharge: HOME | End: 2019-06-10
Payer: COMMERCIAL

## 2019-06-10 VITALS
BODY MASS INDEX: 35.11 KG/M2 | WEIGHT: 211 LBS | SYSTOLIC BLOOD PRESSURE: 128 MMHG | TEMPERATURE: 98.6 F | DIASTOLIC BLOOD PRESSURE: 82 MMHG | HEART RATE: 99 BPM | RESPIRATION RATE: 20 BRPM | OXYGEN SATURATION: 100 %

## 2019-06-10 DIAGNOSIS — S99.911A RIGHT ANKLE INJURY, INITIAL ENCOUNTER: Primary | ICD-10-CM

## 2019-06-10 DIAGNOSIS — S99.911A RIGHT ANKLE INJURY, INITIAL ENCOUNTER: ICD-10-CM

## 2019-06-10 DIAGNOSIS — S82.831A CLOSED FRACTURE FIBULA, HEAD, RIGHT, INITIAL ENCOUNTER: ICD-10-CM

## 2019-06-10 PROCEDURE — 73610 X-RAY EXAM OF ANKLE: CPT

## 2019-06-10 PROCEDURE — 99213 OFFICE O/P EST LOW 20 MIN: CPT | Performed by: NURSE PRACTITIONER

## 2019-06-10 NOTE — PROGRESS NOTES
SUBJECTIVE:  Here today for a rolled ankle  Patient ID: Osiel Jackman is a 25 y.o. female. HPI:   Pt was attempting a kick \"tye\" and landed on ankle it everted and dislocated. Was wrapping it and the ankle bent and it popped into placed and pain decreased. Ankle Pain    The incident occurred 2 days ago. The incident occurred at home. The injury mechanism was an eversion injury. The pain is present in the right ankle. The quality of the pain is described as aching. The pain is moderate. The pain has been constant since onset. Associated symptoms include an inability to bear weight. She reports no foreign bodies present. She has tried non-weight bearing, NSAIDs, ice and elevation (on crutches) for the symptoms. The treatment provided no relief. Past Medical History:   Diagnosis Date    Anxiety and depression 1/30/2019    Broken arm     MRSA (methicillin resistant staph aureus) culture positive 08/2014    Pericarditis 2000    hx      Prior to Visit Medications    Medication Sig Taking?  Authorizing Provider   Norgestim-Eth Estrad Triphasic (ORTHO TRI-CYCLEN LO) 0.18/0.215/0.25 MG-25 MCG TABS 1 po daily Yes HOSSEIN Delarosa   ondansetron (ZOFRAN-ODT) 8 MG TBDP disintegrating tablet 1/2-1 meltaway orally every 8hrs prn nausea and vomiting Yes HOSSEIN Archuleta   promethazine (PHENERGAN) 25 MG tablet Take 1 tablet by mouth every 6 hours as needed for Nausea Yes HOSSEIN Archuleta   PARoxetine (PAXIL CR) 25 MG extended release tablet Take 1 tablet by mouth every morning Yes HOSSEIN Archuleta   fluticasone (FLONASE) 50 MCG/ACT nasal spray 2 sprays by Nasal route daily as needed  Yes Historical Provider, MD   valACYclovir (VALTREX) 1 g tablet  Yes Historical Provider, MD   esomeprazole (NEXIUM) 40 MG delayed release capsule Take 1 capsule by mouth every morning (before breakfast) Yes HOSSEIN Archuleta   dicyclomine (BENTYL) 20 MG tablet Take 1 tablet by mouth 3 times daily as needed (abdominal spasms) Yes

## 2025-07-14 ENCOUNTER — OFFICE VISIT (OUTPATIENT)
Dept: PRIMARY CARE CLINIC | Age: 30
End: 2025-07-14

## 2025-07-14 VITALS
TEMPERATURE: 97.6 F | WEIGHT: 220 LBS | OXYGEN SATURATION: 99 % | SYSTOLIC BLOOD PRESSURE: 118 MMHG | DIASTOLIC BLOOD PRESSURE: 80 MMHG | HEIGHT: 65 IN | HEART RATE: 67 BPM | BODY MASS INDEX: 36.65 KG/M2

## 2025-07-14 DIAGNOSIS — Z76.89 ENCOUNTER TO ESTABLISH CARE: Primary | ICD-10-CM
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
## 2025-07-14 DIAGNOSIS — M54.2 NECK PAIN: ICD-10-CM

## 2025-07-14 PROCEDURE — 96372 THER/PROPH/DIAG INJ SC/IM: CPT | Performed by: NURSE PRACTITIONER

## 2025-07-14 PROCEDURE — 99204 OFFICE O/P NEW MOD 45 MIN: CPT | Performed by: NURSE PRACTITIONER

## 2025-07-14 RX ORDER — METHOCARBAMOL 500 MG/1
500 TABLET, FILM COATED ORAL 3 TIMES DAILY
COMMUNITY

## 2025-07-14 RX ORDER — IBUPROFEN 800 MG/1
800 TABLET, FILM COATED ORAL EVERY 8 HOURS PRN
COMMUNITY

## 2025-07-14 RX ORDER — METHYLPREDNISOLONE 4 MG/1
TABLET ORAL
Qty: 1 KIT | Refills: 0 | Status: SHIPPED | OUTPATIENT
Start: 2025-07-14

## 2025-07-14 RX ORDER — METHOCARBAMOL 750 MG/1
750 TABLET, FILM COATED ORAL 4 TIMES DAILY
Qty: 40 TABLET | Refills: 0 | Status: SHIPPED | OUTPATIENT
Start: 2025-07-14 | End: 2025-07-24

## 2025-07-14 RX ORDER — DEXAMETHASONE SODIUM PHOSPHATE 10 MG/ML
10 INJECTION, SOLUTION INTRA-ARTICULAR; INTRALESIONAL; INTRAMUSCULAR; INTRAVENOUS; SOFT TISSUE ONCE
Status: COMPLETED | OUTPATIENT
Start: 2025-07-14 | End: 2025-07-14

## 2025-07-14 RX ORDER — KETOROLAC TROMETHAMINE 30 MG/ML
60 INJECTION, SOLUTION INTRAMUSCULAR; INTRAVENOUS ONCE
Status: COMPLETED | OUTPATIENT
Start: 2025-07-14 | End: 2025-07-14

## 2025-07-14 RX ADMIN — DEXAMETHASONE SODIUM PHOSPHATE 10 MG: 10 INJECTION, SOLUTION INTRA-ARTICULAR; INTRALESIONAL; INTRAMUSCULAR; INTRAVENOUS; SOFT TISSUE at 12:14

## 2025-07-14 RX ADMIN — KETOROLAC TROMETHAMINE 60 MG: 30 INJECTION, SOLUTION INTRAMUSCULAR; INTRAVENOUS at 12:15

## 2025-07-14 SDOH — HEALTH STABILITY: PHYSICAL HEALTH: ON AVERAGE, HOW MANY MINUTES DO YOU ENGAGE IN EXERCISE AT THIS LEVEL?: 30 MIN

## 2025-07-14 SDOH — ECONOMIC STABILITY: FOOD INSECURITY: WITHIN THE PAST 12 MONTHS, YOU WORRIED THAT YOUR FOOD WOULD RUN OUT BEFORE YOU GOT MONEY TO BUY MORE.: NEVER TRUE

## 2025-07-14 SDOH — ECONOMIC STABILITY: FOOD INSECURITY: WITHIN THE PAST 12 MONTHS, THE FOOD YOU BOUGHT JUST DIDN'T LAST AND YOU DIDN'T HAVE MONEY TO GET MORE.: NEVER TRUE

## 2025-07-14 SDOH — HEALTH STABILITY: PHYSICAL HEALTH: ON AVERAGE, HOW MANY DAYS PER WEEK DO YOU ENGAGE IN MODERATE TO STRENUOUS EXERCISE (LIKE A BRISK WALK)?: 3 DAYS

## 2025-07-14 ASSESSMENT — PATIENT HEALTH QUESTIONNAIRE - PHQ9
SUM OF ALL RESPONSES TO PHQ QUESTIONS 1-9: 0
4. FEELING TIRED OR HAVING LITTLE ENERGY: NOT AT ALL
2. FEELING DOWN, DEPRESSED OR HOPELESS: NOT AT ALL
8. MOVING OR SPEAKING SO SLOWLY THAT OTHER PEOPLE COULD HAVE NOTICED. OR THE OPPOSITE, BEING SO FIGETY OR RESTLESS THAT YOU HAVE BEEN MOVING AROUND A LOT MORE THAN USUAL: NOT AT ALL
10. IF YOU CHECKED OFF ANY PROBLEMS, HOW DIFFICULT HAVE THESE PROBLEMS MADE IT FOR YOU TO DO YOUR WORK, TAKE CARE OF THINGS AT HOME, OR GET ALONG WITH OTHER PEOPLE: NOT DIFFICULT AT ALL
1. LITTLE INTEREST OR PLEASURE IN DOING THINGS: NOT AT ALL
SUM OF ALL RESPONSES TO PHQ QUESTIONS 1-9: 0
5. POOR APPETITE OR OVEREATING: NOT AT ALL
9. THOUGHTS THAT YOU WOULD BE BETTER OFF DEAD, OR OF HURTING YOURSELF: NOT AT ALL
6. FEELING BAD ABOUT YOURSELF - OR THAT YOU ARE A FAILURE OR HAVE LET YOURSELF OR YOUR FAMILY DOWN: NOT AT ALL
3. TROUBLE FALLING OR STAYING ASLEEP: NOT AT ALL
7. TROUBLE CONCENTRATING ON THINGS, SUCH AS READING THE NEWSPAPER OR WATCHING TELEVISION: NOT AT ALL

## 2025-07-14 ASSESSMENT — ENCOUNTER SYMPTOMS
BACK PAIN: 0
NAUSEA: 0
PHOTOPHOBIA: 0
RHINORRHEA: 0
SHORTNESS OF BREATH: 0
COUGH: 0
VOMITING: 0
COLOR CHANGE: 0
VOICE CHANGE: 0
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
## 2025-07-30 ENCOUNTER — OFFICE VISIT (OUTPATIENT)
Dept: PRIMARY CARE CLINIC | Age: 30
End: 2025-07-30

## 2025-07-30 VITALS
DIASTOLIC BLOOD PRESSURE: 82 MMHG | BODY MASS INDEX: 36.25 KG/M2 | TEMPERATURE: 97.6 F | HEART RATE: 103 BPM | OXYGEN SATURATION: 99 % | HEIGHT: 65 IN | WEIGHT: 217.6 LBS | SYSTOLIC BLOOD PRESSURE: 128 MMHG

## 2025-07-30 DIAGNOSIS — M54.2 CERVICALGIA: Primary | ICD-10-CM

## 2025-07-30 PROCEDURE — 99213 OFFICE O/P EST LOW 20 MIN: CPT | Performed by: NURSE PRACTITIONER

## 2025-07-30 RX ORDER — METHOCARBAMOL 750 MG/1
750 TABLET, FILM COATED ORAL 3 TIMES DAILY
Qty: 90 TABLET | Refills: 1 | Status: SHIPPED | OUTPATIENT
Start: 2025-07-30 | End: 2025-08-29

## 2025-07-30 ASSESSMENT — ENCOUNTER SYMPTOMS
COUGH: 0
BACK PAIN: 1
SHORTNESS OF BREATH: 0
COLOR CHANGE: 0
VOMITING: 0
NAUSEA: 0
RHINORRHEA: 0
VOICE CHANGE: 0
PHOTOPHOBIA: 0
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
